# Patient Record
Sex: MALE | Race: WHITE | NOT HISPANIC OR LATINO | Employment: FULL TIME | URBAN - METROPOLITAN AREA
[De-identification: names, ages, dates, MRNs, and addresses within clinical notes are randomized per-mention and may not be internally consistent; named-entity substitution may affect disease eponyms.]

---

## 2019-07-17 ENCOUNTER — TRANSFERRED RECORDS (OUTPATIENT)
Dept: HEALTH INFORMATION MANAGEMENT | Facility: CLINIC | Age: 59
End: 2019-07-17

## 2021-05-21 ENCOUNTER — OFFICE VISIT (OUTPATIENT)
Dept: FAMILY MEDICINE | Facility: CLINIC | Age: 61
End: 2021-05-21
Payer: COMMERCIAL

## 2021-05-21 VITALS
TEMPERATURE: 97.5 F | WEIGHT: 233 LBS | RESPIRATION RATE: 16 BRPM | DIASTOLIC BLOOD PRESSURE: 70 MMHG | OXYGEN SATURATION: 97 % | BODY MASS INDEX: 34.51 KG/M2 | SYSTOLIC BLOOD PRESSURE: 100 MMHG | HEIGHT: 69 IN | HEART RATE: 61 BPM

## 2021-05-21 DIAGNOSIS — I78.1 NEVUS, NON-NEOPLASTIC: ICD-10-CM

## 2021-05-21 DIAGNOSIS — Z13.1 SCREENING FOR DIABETES MELLITUS: ICD-10-CM

## 2021-05-21 DIAGNOSIS — E66.811 CLASS 1 OBESITY DUE TO EXCESS CALORIES WITHOUT SERIOUS COMORBIDITY WITH BODY MASS INDEX (BMI) OF 34.0 TO 34.9 IN ADULT: ICD-10-CM

## 2021-05-21 DIAGNOSIS — Z84.81 FAMILY HISTORY OF BRCA GENE MUTATION: ICD-10-CM

## 2021-05-21 DIAGNOSIS — Z13.220 SCREENING CHOLESTEROL LEVEL: ICD-10-CM

## 2021-05-21 DIAGNOSIS — N50.89 MASS OF RIGHT TESTICLE: ICD-10-CM

## 2021-05-21 DIAGNOSIS — Z00.00 ROUTINE GENERAL MEDICAL EXAMINATION AT A HEALTH CARE FACILITY: Primary | ICD-10-CM

## 2021-05-21 DIAGNOSIS — E66.09 CLASS 1 OBESITY DUE TO EXCESS CALORIES WITHOUT SERIOUS COMORBIDITY WITH BODY MASS INDEX (BMI) OF 34.0 TO 34.9 IN ADULT: ICD-10-CM

## 2021-05-21 LAB
CHOLEST SERPL-MCNC: 184 MG/DL
GLUCOSE SERPL-MCNC: 90 MG/DL (ref 70–99)
HDLC SERPL-MCNC: 50 MG/DL
LDLC SERPL CALC-MCNC: 102 MG/DL
NONHDLC SERPL-MCNC: 134 MG/DL
TRIGL SERPL-MCNC: 162 MG/DL

## 2021-05-21 PROCEDURE — 99213 OFFICE O/P EST LOW 20 MIN: CPT | Mod: 25 | Performed by: FAMILY MEDICINE

## 2021-05-21 PROCEDURE — 99386 PREV VISIT NEW AGE 40-64: CPT | Performed by: FAMILY MEDICINE

## 2021-05-21 PROCEDURE — 80061 LIPID PANEL: CPT | Performed by: FAMILY MEDICINE

## 2021-05-21 PROCEDURE — 36415 COLL VENOUS BLD VENIPUNCTURE: CPT | Performed by: FAMILY MEDICINE

## 2021-05-21 PROCEDURE — 82947 ASSAY GLUCOSE BLOOD QUANT: CPT | Performed by: FAMILY MEDICINE

## 2021-05-21 ASSESSMENT — MIFFLIN-ST. JEOR: SCORE: 1858.29

## 2021-05-21 NOTE — LETTER
"May 24, 2021      Ned Wilcox  [address]  [address]   [place] [address]        Dear ,    We are writing to inform you of your test results.    \"Most recent diabetic screening has returned satisfactory.   Cholesterol screening also looks pretty good. No indication for any medications at this time. Continue to work on healthy diet and exercise.     Dr. Ryland Rivera\"    Resulted Orders   GLUCOSE   Result Value Ref Range    Glucose 90 70 - 99 mg/dL   Lipid panel reflex to direct LDL Fasting   Result Value Ref Range    Cholesterol 184 <200 mg/dL    Triglycerides 162 (H) <150 mg/dL      Comment:      Borderline high:  150-199 mg/dl  High:             200-499 mg/dl  Very high:       >499 mg/dl      HDL Cholesterol 50 >39 mg/dL    LDL Cholesterol Calculated 102 (H) <100 mg/dL      Comment:      Above desirable:  100-129 mg/dl  Borderline High:  130-159 mg/dL  High:             160-189 mg/dL  Very high:       >189 mg/dl      Non HDL Cholesterol 134 (H) <130 mg/dL      Comment:      Above Desirable:  130-159 mg/dl  Borderline high:  160-189 mg/dl  High:             190-219 mg/dl  Very high:       >219 mg/dl         If you have any questions or concerns, please call the clinic at the number listed above.       Sincerely,      Ryland Rivera, DO          "

## 2021-05-24 NOTE — RESULT ENCOUNTER NOTE
Most recent diabetic screening has returned satisfactory.     Cholesterol screening also looks pretty good. No indication for any medications at this time. Continue to work on healthy diet and exercise.     Dr. Ryland Rivera

## 2021-06-29 PROBLEM — J30.2 SEASONAL ALLERGIC RHINITIS: Status: ACTIVE | Noted: 2021-06-29

## 2021-06-30 ENCOUNTER — VIRTUAL VISIT (OUTPATIENT)
Dept: ONCOLOGY | Facility: CLINIC | Age: 61
End: 2021-06-30
Attending: FAMILY MEDICINE
Payer: COMMERCIAL

## 2021-06-30 DIAGNOSIS — Z80.3 FAMILY HISTORY OF MALIGNANT NEOPLASM OF BREAST: ICD-10-CM

## 2021-06-30 DIAGNOSIS — Z84.81 FAMILY HISTORY OF BRCA GENE MUTATION: Primary | ICD-10-CM

## 2021-06-30 DIAGNOSIS — Z80.49 FAMILY HISTORY OF UTERINE CANCER: ICD-10-CM

## 2021-06-30 PROCEDURE — 96040 HC GENETIC COUNSELING, EACH 30 MINUTES: CPT | Mod: TEL | Performed by: GENETIC COUNSELOR, MS

## 2021-06-30 NOTE — PROGRESS NOTES
6/30/2021    Referring Provider: Ryland Rivera MD    Presenting Information:   I met with Ned Wilcox today for genetic counseling at the Cancer Risk Management Program (virtual/telephone visit) to discuss his family history of breast and uterine cancer, including a previously identified BRCA2 mutation in the family.  He is here today to review this history and available genetic testing options.    Personal History:  Ned is a 61 year old male. He does not have any personal history of cancer.  He had a normal colonoscopy in 2013, and sees his primary care provider regularly.  He has been referred to dermatology.        Family History: (Please see scanned pedigree for detailed family history information)    One sister was diagnosed with breast cancer at age 48 and passed away at age 56.  Her daughter, Christina, completed genetic testing through [facility] which detected a pathogenic BRCA2 mutation called [id number] (also known as p.*).  One variant of unknown significance was detected in MSH6 (p.M492V).  Testing was otherwise negative for the 34 genes analyzed. A copy of her test report was available for review ([facility] Accession #[id number])      One sister was diagnosed with uterine cancer at age 60, and also reports that she carries the familial BRCA2 gene mutation.      His maternal uncle passed away due to brain cancer in his 30's    His father had a history of lymphoma diagnosed in his 70's    His maternal ethnicity is English/Y[place]i. His paternal ethnicity is Niuean.  There is no known Ashkenazi Pentecostal ancestry on either side of his family.     Discussion:    Ned has a family history of breast and uterine cancer with an identified BRCA2 gene mutation.  Specifically, the mutation identified in his niece, and reportedly one of his sisters, is called [id number] (also known as p.*).  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 A detailed handout regarding this cancer syndrome and the information we discussed was provided to Ned at the end of our appointment today and can be found in the after visit summary.  Topics included: inheritance pattern, cancer risks, cancer screening recommendations, and also risks, benefits and limitations of testing.    We reviewed that mutations in the BRCA2 gene are inherited in an autosomal dominant pattern.  This means that Ned has a 50% chance of inheriting the same mutation which was identified in his niece and reportedly his sister (however, a copy of his sister's positive test report was not available for review).  Should Ned be found to carry this BRCA2 mutation, his daughter would have a 50% chance of inheriting the mutation as well.      We discussed that there are additional genes that could cause increased risk for breast, uterine and other cancer. As many of these genes present with overlapping features in a family and accurate cancer risk cannot always be established based upon the pedigree analysis alone, it is sometimes reasonable to consider panel genetic testing to analyze multiple genes at once. Ned elected to first proceed with targeted testing today for the known familial variant.    Verbal consent was obtained and specific site BRCA2 analysis for the mutation previously identified in Ned's family was sent to [facility].  A copy of his niece's positive test report (performed through [facility]) was also sent to the testing laboratory.  A saliva kit will be mailed to Ned from [facility].     The information from genetic testing may determine additional cancer screening recommendations as well as options for risk reducing surgeries for Ned and  relatives, per current NCCN guidelines for BRCA2.   These recommendations will be discussed in detail once  genetic testing is completed.    Plan:  1) Today Ned elected to proceed with single site testing for the known BRCA2 mutation in the family.   2) This information should be available in 3-4 weeks.  3) Ned will schedule a return visit to discuss the results.    Face to face time: 40 minutes    Sherin Davies MS, [facility]  Licensed, Certified Genetic Counselor  [facility]  Phone: [phone]

## 2021-06-30 NOTE — LETTER
"    6/30/2021         RE: Ned Wilcox  [address]  [address]  [place] [address]        Dear Colleague,    Thank you for referring your patient, Ned Wilcox, to the [facility]. Please see a copy of my visit note below.    Ned is a 61 year old who is being evaluated via a billable telephone visit.      What phone number would you like to be contacted at? [phone]  How would you like to obtain your AVS? Cadynoekevin     Vitals - Patient Reported  Weight (Patient Reported): 102.1 kg (225 lb)  Height (Patient Reported): 174.6 cm (5' 8.75\")  BMI (Based on Pt Reported Ht/Wt): 33.47  Pain Score: No Pain (0)    Jazz FARR      6/30/2021    Referring Provider: Ryland Rivera MD    Presenting Information:   I met with Ned Wilcox today for genetic counseling at the Cancer Risk Management Program (virtual/telephone visit) to discuss his family history of breast and uterine cancer, including a previously identified BRCA2 mutation in the family.  He is here today to review this history and available genetic testing options.    Personal History:  Ned is a 61 year old male. He does not have any personal history of cancer.  He had a normal colonoscopy in 2013, and sees his primary care provider regularly.  He has been referred to dermatology.        Family History: (Please see scanned pedigree for detailed family history information)    One sister was diagnosed with breast cancer at age 48 and passed away at age 56.  Her daughter, Christina, completed genetic testing through [facility] which detected a pathogenic BRCA2 mutation called [id number] (also known as p.*).  One variant of unknown significance was detected in MSH6 (p.M492V).  Testing was otherwise negative for the 34 genes analyzed. A copy of her test report was available for review ([facility] Accession #[id number])      One sister was diagnosed with uterine cancer at age 60, and also reports that she carries " the familial BRCA2 gene mutation.      His maternal uncle passed away due to brain cancer in his 30's    His father had a history of lymphoma diagnosed in his 70's    His maternal ethnicity is English/Turkish. His paternal ethnicity is Kenyan.  There is no known Ashkenazi Yarsanism ancestry on either side of his family.     Discussion:    Ned has a family history of breast and uterine cancer with an identified BRCA2 gene mutation.  Specifically, the mutation identified in his niece, and reportedly one of his sisters, is called [id number] (also known as p.*).  We discussed that this gene is consistent with a diagnosis of Hereditary Breast and Ovarian Cancer (HBOC) syndrome     We discussed the natural history and genetics of HBOC caused by mutations in the BRCA2 gene. A detailed handout regarding this cancer syndrome and the information we discussed was provided to Ned at the end of our appointment today and can be found in the after visit summary.  Topics included: inheritance pattern, cancer risks, cancer screening recommendations, and also risks, benefits and limitations of testing.    We reviewed that mutations in the BRCA2 gene are inherited in an autosomal dominant pattern.  This means that Ned has a 50% chance of inheriting the same mutation which was identified in his niece and reportedly his sister (however, a copy of his sister's positive test report was not available for review).  Should Ned be found to carry this BRCA2 mutation, his daughter would have a 50% chance of inheriting the mutation as well.      We discussed that there are additional genes that could cause increased risk for breast, uterine and other cancer. As many of these genes present with overlapping features in a family and accurate cancer risk cannot always be established based upon the pedigree analysis alone, it is sometimes reasonable to consider panel genetic testing to analyze multiple genes at once. Ned elected to  first proceed with targeted testing today for the known familial variant.    Verbal consent was obtained and specific site BRCA2 analysis for the mutation previously identified in Ned's family was sent to [facility].  A copy of his niece's positive test report (performed through [facility]) was also sent to the testing laboratory.  A saliva kit will be mailed to Ned from [facility].     The information from genetic testing may determine additional cancer screening recommendations as well as options for risk reducing surgeries for Ned and  relatives, per current NCCN guidelines for BRCA2.   These recommendations will be discussed in detail once genetic testing is completed.    Plan:  1) Today Ned elected to proceed with single site testing for the known BRCA2 mutation in the family.   2) This information should be available in 3-4 weeks.  3) Ned will schedule a return visit to discuss the results.    Face to face time: 40 minutes    Sherin Davies MS, [facility]  Licensed, Certified Genetic Counselor  [facility]  Phone: [phone]                Again, thank you for allowing me to participate in the care of your patient.        Sincerely,        Sherin Davies GC

## 2021-07-02 NOTE — PATIENT INSTRUCTIONS
Assessing Cancer Risk  Only about 5-10% of cancers are thought to be due to an inherited cancer susceptibility gene.    These families often have:    Several people with the same or related types of cancer    Cancers diagnosed at a young age (before age 50)    Individuals with more than one primary cancer    Multiple generations of the family affected with cancer    BRCA1 and BRCA2 Genes  We each inherit two copies of every gene in our bodies: one from our mother, and one from our father.  Each gene has a specific job to do.  When a gene has a mistake or  mutation  in it, it does not work like it should.  Everyone has two copies of BRCA1 and two copies of BRCA2.  A single mutation in one of the copies of BRCA1 or BRCA2 increases the risk for breast and ovarian cancer, among others.  The risk for pancreatic cancer and melanoma may also be slightly increased in some families.  The tables below list the chance that someone with a BRCA mutation would develop cancer in his or her lifetime1,2,3,4.      Women   Men    General Population BRCA +   General Population BRCA +   Breast 12% 40-85%  Breast <1% ~7%   Ovarian 1-2% 10-40%  Prostate 16% 20%     Inheriting a mutation does not mean a person will develop cancer, but it does significantly increase his or her risk above the general population risk.    A person s ethnic background is also important to consider, as individuals of Ashkenazi Adventist ancestry have a higher chance of having a BRCA gene mutation.  There are three BRCA mutations that occur more frequently in this population.     Genetic Testing  Genetic testing involves a simple blood test and will look at the genetic information in the BRCA1 and BRCA2 genes for any harmful mutations that are associated with increased cancer risk.  If possible, it is recommended that the person(s) who has had cancer be tested before other family members.  That person will give us the most useful information about whether or not  a specific gene is associated with the cancer in the family.     Results  There are three possible results of BRCA1 and BRCA2 genetic testing:    Positive--a harmful mutation was identified    Negative--no mutation was identified    Variant of unknown significance--a variation in one of the genes was identified, but it is unclear how this impacts cancer risk in the family    Advantages and Disadvantages  There are advantages and disadvantages to genetic testing of these genes.    Advantages    May clarify your cancer risk    Can help you make medical decisions    May explain the cancers in your family    May give useful information to your family members (if you share your results)    Disadvantages    Possible negative emotional impact of learning about inherited cancer risk    Uncertainty in interpreting a negative test result in some situations    Possible genetic discrimination concerns (see below)    Inheritance   BRCA1 and BRCA2 mutations are inherited in an autosomal dominant pattern.  This means that if a parent has a mutation, each of his or her children will have a 50% chance of inheriting that same mutation.  Therefore, each child--male or female--would have a 50% chance of being at increased risk for developing cancer.                                              Image obtained from Genetics Home Reference, 2013     Genetic Information Nondiscrimination Act (EVELIA)  EVELIA is a federal law that protects individuals from health insurance or employment discrimination based on a genetic test result alone.  Although rare, there are currently no legal protections in terms of life insurance, long term care, or disability insurances.  Visit the National Human Genome Research [place] at Genome.gov/33204622 to learn more.    Reducing Cancer Risk  Current screening recommendations for women with a BRCA mutation include1:    Breast:  o Breast awareness starting at age 18  o Clinical breast exams starting at age 25;  repeated every 6-12 months  o Annual breast MRI starting at age 25 (or possibly younger)  o Annual mammogram (consider tomosynthesis) and breast MRI at age 30 (for women with and without a breast cancer history)  o Consideration of preventative mastectomy    Ovarian:   o Consideration of transvaginal ultrasound and CA-125 blood test starting at age 30 (or possibly younger); repeated every 6 months  o Recommendation for surgery to remove the ovaries and fallopian tubes after child bearing or by age 35-40. Women with BRCA2 mutations may delay this surgery until ages 40-45, unless it is warranted to consider sooner based on family history.    Some data suggests that women with BRCA1 mutations may be at slightly higher risk for serous uterine cancer. Information is limited at this time, and further research is needed to better understand this risk. Women with BRCA1 mutations should discuss the risks and benefits of hysterectomy at the time of ovary removal.     Current screening recommendations for men with a BRCA mutation include1:    Breast:  o Self-breast exams starting at age 35  o Annual clinical breast exams starting at age 35    Prostate:   o Recommend prostate cancer screening starting at age 45 for BRCA2 carriers  o Consider prostate cancer screening starting at age 45 for BRCA1 carriers    Both men and women with BRCA mutations should talk to their doctor or genetic counselor about screening for pancreatic cancer and melanoma.  In addition, the age at which to start screening may be modified based on family history of young cancer.    Questions to Think About Regarding Genetic Testing    What effect will the test result have on me and my relationship with my family members if I have an inherited gene mutation?  If I don t have a gene mutation?    Should I share my test results, and how will my family react to this news, which may also affect them?    Are my children ready to learn new information that may one  day affect their own health?    Resources  FORCE: Facing Our Risk of Cancer Empowered facingourrisk.org   Bright Pink bebrightpink.org   American Cancer Society (ACS) cancer.org   National Cancer [place] (NCI) cancer.gov     Please call us if you have any questions or concerns.   Cancer Risk Management Program [phone] ([phone])  ? Juan José Frias, MS [facility]  [phone]  ? Paige Fay, MS, [facility]  [phone]  ? Sherin Davies, MS, [facility]  [phone]  ? Radha Cook, MS, [facility] [phone]  ? Tawny Duran, MS, [facility] [phone]  ? Clotilde Munroe, MS, [facility]  [phone]    References:  1. National Comprehensive Cancer Network. Clinical practice guidelines in oncology, colorectal cancer screening. Available online (registration required). 2019.

## 2021-07-08 ENCOUNTER — OFFICE VISIT (OUTPATIENT)
Dept: DERMATOLOGY | Facility: CLINIC | Age: 61
End: 2021-07-08
Attending: FAMILY MEDICINE
Payer: COMMERCIAL

## 2021-07-08 VITALS — OXYGEN SATURATION: 99 % | HEART RATE: 73 BPM | SYSTOLIC BLOOD PRESSURE: 123 MMHG | DIASTOLIC BLOOD PRESSURE: 81 MMHG

## 2021-07-08 DIAGNOSIS — Q82.5 CONGENITAL NEVUS: ICD-10-CM

## 2021-07-08 DIAGNOSIS — D18.01 CHERRY ANGIOMA: ICD-10-CM

## 2021-07-08 DIAGNOSIS — L82.1 SEBORRHEIC KERATOSIS: ICD-10-CM

## 2021-07-08 DIAGNOSIS — D22.9 MULTIPLE BENIGN NEVI: Primary | ICD-10-CM

## 2021-07-08 DIAGNOSIS — L81.4 LENTIGO: ICD-10-CM

## 2021-07-08 DIAGNOSIS — L91.8 INFLAMED SKIN TAG: ICD-10-CM

## 2021-07-08 PROCEDURE — 11200 RMVL SKIN TAGS UP TO&INC 15: CPT | Performed by: PHYSICIAN ASSISTANT

## 2021-07-08 PROCEDURE — 99203 OFFICE O/P NEW LOW 30 MIN: CPT | Mod: 25 | Performed by: PHYSICIAN ASSISTANT

## 2021-07-08 NOTE — PROGRESS NOTES
Ned Wilcox is an extremely pleasant 61 year old year old male patient here today for skin check and skin tags on axilla. He notes skin tags will get irritate with clothing. He wear sunscreen but has had a history of blistering sunburns.  Patient has no other skin complaints today.  Remainder of the HPI, Meds, PMH, Allergies, FH, and SH was reviewed in chart.    Pertinent Hx:   No personal history of skin cancer.   History reviewed. No pertinent past medical history.    Past Surgical History:   Procedure Laterality Date     ANKLE FRACTURE SURGERY Right      APPENDECTOMY      age 14        Family History   Problem Relation Age of Onset     Dementia Mother      Hyperlipidemia Mother      Lymphoma Father      Diabetes Father      Breast Cancer Sister      Ovarian Cancer Sister      Breast Cancer Sister        Social History     Socioeconomic History     Marital status:      Spouse name: Not on file     Number of children: Not on file     Years of education: Not on file     Highest education level: Not on file   Occupational History     Not on file   Social Needs     Financial resource strain: Not on file     Food insecurity     Worry: Not on file     Inability: Not on file     Transportation needs     Medical: Not on file     Non-medical: Not on file   Tobacco Use     Smoking status: Never Smoker     Smokeless tobacco: Never Used   Substance and Sexual Activity     Alcohol use: Yes     Comment: couple beers a week     Drug use: Never     Sexual activity: Yes     Partners: Female   Lifestyle     Physical activity     Days per week: Not on file     Minutes per session: Not on file     Stress: Not on file   Relationships     Social connections     Talks on phone: Not on file     Gets together: Not on file     Attends Restoration service: Not on file     Active member of club or organization: Not on file     Attends meetings of clubs or organizations: Not on file     Relationship status: Not on file     Intimate  partner violence     Fear of current or ex partner: Not on file     Emotionally abused: Not on file     Physically abused: Not on file     Forced sexual activity: Not on file   Other Topics Concern     Not on file   Social History Narrative     Not on file       No outpatient encounter medications on file as of 7/8/2021.     No facility-administered encounter medications on file as of 7/8/2021.              O:   NAD, WDWN, Alert & Oriented, Mood & Affect wnl, Vitals stable   Here today alone   /81   Pulse 73   SpO2 99%    General appearance normal   Vitals stable   Alert, oriented and in no acute distress   Skin colored pedunculated papules on axilla   Stuck on papules and brown macules on trunk and ext   Red papules on trunk  Brown papules and macules with regular pigment network and borders on torso and extremities  White papule on on right temple  Brown plaque on left leg    The remainder of skin exam is normal     Eyes: Conjunctivae/lids:Normal     ENT: Lips: normal    MSK:Normal    Pulm: Breathing Normal    Neuro/Psych: Orientation:Alert and Orientedx3 ; Mood/Affect:normal   A/P:  1. Milia on right temple   With patient consent, unroofed and expressed.  2. Inflamed skin tags on axilla  With patient consent, tangential excision was preformed. Routine wound care.   3.  Seborrheic keratosis, lentigo, angioma, benign nevi, benign nevi, congenital nevus   BENIGN LESIONS DISCUSSED WITH PATIENT:  I discussed the specifics of tumor, prognosis, and genetics of benign lesions.  I explained that treatment of these lesions would be purely cosmetic and not medically neccessary.  I discussed with patient different removal options including excision, cautery and /or laser.      Nature and genetics of benign skin lesions dicussed with patient.  Signs and Symptoms of skin cancer discussed with patient.  ABCDEs of melanoma reviewed with patient.  Patient encouraged to perform monthly skin exams.  UV precautions reviewed  with patient.  Risks of non-melanoma skin cancer discussed with patient   Return to clinic in one year or sooner if needed.

## 2021-07-08 NOTE — LETTER
7/8/2021         RE: Ned Wilcox  [address]  [address]  [facility] [address]        Dear Colleague,    Thank you for referring your patient, Ned Wilcox, to the [facility]. Please see a copy of my visit note below.    Ned Wilcox is an extremely pleasant 61 year old year old male patient here today for skin check and skin tags on axilla. He notes skin tags will get irritate with clothing. He wear sunscreen but has had a history of blistering sunburns.  Patient has no other skin complaints today.  Remainder of the HPI, Meds, PMH, Allergies, FH, and SH was reviewed in chart.    Pertinent Hx:   No personal history of skin cancer.   History reviewed. No pertinent past medical history.    Past Surgical History:   Procedure Laterality Date     ANKLE FRACTURE SURGERY Right      APPENDECTOMY      age 14        Family History   Problem Relation Age of Onset     Dementia Mother      Hyperlipidemia Mother      Lymphoma Father      Diabetes Father      Breast Cancer Sister      Ovarian Cancer Sister      Breast Cancer Sister        Social History     Socioeconomic History     Marital status:      Spouse name: Not on file     Number of children: Not on file     Years of education: Not on file     Highest education level: Not on file   Occupational History     Not on file   Social Needs     Financial resource strain: Not on file     Food insecurity     Worry: Not on file     Inability: Not on file     Transportation needs     Medical: Not on file     Non-medical: Not on file   Tobacco Use     Smoking status: Never Smoker     Smokeless tobacco: Never Used   Substance and Sexual Activity     Alcohol use: Yes     Comment: couple beers a week     Drug use: Never     Sexual activity: Yes     Partners: Female   Lifestyle     Physical activity     Days per week: Not on file     Minutes per session: Not on file     Stress: Not on file   Relationships     Social connections     Talks on  phone: Not on file     Gets together: Not on file     Attends [employer] service: Not on file     Active member of club or organization: Not on file     Attends meetings of clubs or organizations: Not on file     Relationship status: Not on file     Intimate partner violence     Fear of current or ex partner: Not on file     Emotionally abused: Not on file     Physically abused: Not on file     Forced sexual activity: Not on file   Other Topics Concern     Not on file   Social History Narrative     Not on file       No outpatient encounter medications on file as of 7/8/2021.     No facility-administered encounter medications on file as of 7/8/2021.              O:   NAD, WDWN, Alert & Oriented, Mood & Affect wnl, Vitals stable   Here today alone   /81   Pulse 73   SpO2 99%    General appearance normal   Vitals stable   Alert, oriented and in no acute distress   Skin colored pedunculated papules on axilla   Stuck on papules and brown macules on trunk and ext   Red papules on trunk  Brown papules and macules with regular pigment network and borders on torso and extremities  White papule on on right temple  Brown plaque on left leg    The remainder of skin exam is normal     Eyes: Conjunctivae/lids:Normal     ENT: Lips: normal    MSK:Normal    Pulm: Breathing Normal    Neuro/Psych: Orientation:Alert and Orientedx3 ; Mood/Affect:normal   A/P:  1. Milia on right temple   With patient consent, unroofed and expressed.  2. Inflamed skin tags on axilla  With patient consent, tangential excision was preformed. Routine wound care.   3.  Seborrheic keratosis, lentigo, angioma, benign nevi, benign nevi, congenital nevus   BENIGN LESIONS DISCUSSED WITH PATIENT:  I discussed the specifics of tumor, prognosis, and genetics of benign lesions.  I explained that treatment of these lesions would be purely cosmetic and not medically neccessary.  I discussed with patient different removal options including excision, cautery and  /or laser.      Nature and genetics of benign skin lesions dicussed with patient.  Signs and Symptoms of skin cancer discussed with patient.  ABCDEs of melanoma reviewed with patient.  Patient encouraged to perform monthly skin exams.  UV precautions reviewed with patient.  Risks of non-melanoma skin cancer discussed with patient   Return to clinic in one year or sooner if needed.         Again, thank you for allowing me to participate in the care of your patient.        Sincerely,        Elicia Arellano PA-C

## 2021-08-09 ENCOUNTER — VIRTUAL VISIT (OUTPATIENT)
Dept: ONCOLOGY | Facility: CLINIC | Age: 61
End: 2021-08-09
Attending: GENETIC COUNSELOR, MS
Payer: COMMERCIAL

## 2021-08-09 DIAGNOSIS — Z84.81 FAMILY HISTORY OF BRCA GENE MUTATION: Primary | ICD-10-CM

## 2021-08-09 DIAGNOSIS — Z80.3 FAMILY HISTORY OF MALIGNANT NEOPLASM OF BREAST: ICD-10-CM

## 2021-08-09 DIAGNOSIS — Z80.49 FAMILY HISTORY OF UTERINE CANCER: ICD-10-CM

## 2021-08-09 PROCEDURE — 999N000069 HC STATISTIC GENETIC COUNSELING, < 16 MIN: Mod: TEL | Performed by: GENETIC COUNSELOR, MS

## 2021-08-09 NOTE — PROGRESS NOTES
"8/9/2021    Referring Provider: Ryland Rivera MD    Presenting Information:  Ned Wilcox returned to the Cancer Risk Management Program (virtual visit) to discuss his genetic testing results. Specific site analysis of BRCA2 was ordered through [employer] due to his family history of a known mutation.       Genetic Testing Results: NEGATIVE   Ned's test results were negative.  He does not carry the [id number] pathogenic mutation in the BRCA2 gene, which was previously identified in his niece. This test only looked for this one mutation.    A copy of the test report can be found in the Laboratory tab, dated 7/20/2021, and named \"SEND OUTS MISC TEST\". The report is scanned in as a linked document.    Interpretation:  Based on this test result, Ned does not have HBOC syndrome and is not at an increased risk for the associated cancers. Even though he does not have the familial BRCA2 mutation, he is still at general population lifetime risk for cancer and should be followed for routine screening.       Screening:  We discussed the importance of cancer screening based on Ned's personal and family history.  Population cancer screening options, such as those recommended by the American Cancer Society and the National Comprehensive Cancer Network (NCCN), are appropriate for Ned at this time. These screening recommendations may change if there are changes to Ned's personal and/or family history of cancer. Final screening recommendations should be made by each individual's managing physician.    Inheritance:  We reviewed the autosomal dominant inheritance of this BRCA2 mutation. We discussed that Ned cannot pass on this mutation to his children based on this test result.  Mutations in this gene do not skip generations.      Plan:  1. I provided Ned with the results of his specific site BRCA2 analysis.   2. Ned is encouraged to contact me annually and/or with any changes to his personal/family history, as this " information may inform future cancer screening or genetic testing recommendations.  3. If there are any further questions or concerns, he should not hesitate to contact me at [phone].    Time spent on phone: 10 minutes    Sherin Davies MS, [facility] [place]  Licensed, Certified Genetic Counselor  [facility]  Phone: [phone]

## 2021-08-09 NOTE — LETTER
"    8/9/2021         RE: Ned Wilcox  [address]  [address]  [facility] [address]        Dear Colleague,    Thank you for referring your patient, Ned Wilcox, to the [facility]. Please see a copy of my visit note below.    8/9/2021    Referring Provider: Ryland Rivera MD    Presenting Information:  Ned Wilcox returned to the Cancer Risk Management Program (virtual visit) to discuss his genetic testing results. Specific site analysis of BRCA2 was ordered through [employer] due to his family history of a known mutation.       Genetic Testing Results: NEGATIVE   Ned's test results were negative.  He does not carry the [id number] pathogenic mutation in the BRCA2 gene, which was previously identified in his niece. This test only looked for this one mutation.    A copy of the test report can be found in the Laboratory tab, dated 7/20/2021, and named \"SEND OUTS MISC TEST\". The report is scanned in as a linked document.    Interpretation:  Based on this test result, Ned does not have HBOC syndrome and is not at an increased risk for the associated cancers. Even though he does not have the familial BRCA2 mutation, he is still at general population lifetime risk for cancer and should be followed for routine screening.       Screening:  We discussed the importance of cancer screening based on Ned's personal and family history.  Population cancer screening options, such as those recommended by the American Cancer Society and the National Comprehensive Cancer Network (NCCN), are appropriate for Ned at this time. These screening recommendations may change if there are changes to Ned's personal and/or family history of cancer. Final screening recommendations should be made by each individual's managing physician.    Inheritance:  We reviewed the autosomal dominant inheritance of this BRCA2 mutation. We discussed that Ned cannot pass on this mutation to his children based " on this test result.  Mutations in this gene do not skip generations.      Plan:  1. I provided Ned with the results of his specific site BRCA2 analysis.   2. Ned is encouraged to contact me annually and/or with any changes to his personal/family history, as this information may inform future cancer screening or genetic testing recommendations.  3. If there are any further questions or concerns, he should not hesitate to contact me at [phone].    Time spent on phone: 10 minutes    Sherin Davies MS, [facility] [place]  Licensed, Certified Genetic Counselor  [facility]  Phone: [phone]                Again, thank you for allowing me to participate in the care of your patient.        Sincerely,        Sherin Davies GC

## 2021-08-09 NOTE — LETTER
Cancer Risk Management  Program Locations    [facility]  [facility]  [facility]  [facility]  Mailing Address  Cancer Risk Management Program  [address]  [place] [address]    New patient appointments  [phone]  August 18, 2021    Ned Wilcox  [address]  [address]   [facility] [address]      Dear Ned,    It was a pleasure speaking with you on the phone on 8/9/2021. Here is a copy of the progress note from our discussion. If you have any additional questions, please feel free to call.    Referring Provider: Ryland Rivera MD    Presenting Information:  Ned Wilcox returned to the Cancer Risk Management Program (virtual visit) to discuss his genetic testing results. Specific site analysis of BRCA2 was ordered through [employer] [facility] due to his family history of a known mutation.       Genetic Testing Results: NEGATIVE   Ned's test results were negative.  He does not carry the [id number] pathogenic mutation in the BRCA2 gene, which was previously identified in his niece. This test only looked for this one mutation.    Interpretation:  Based on this test result, Ned does not have HBOC syndrome and is not at an increased risk for the associated cancers. Even though he does not have the familial BRCA2 mutation, he is still at general population lifetime risk for cancer and should be followed for routine screening.       Screening:  We discussed the importance of cancer screening based on Ned's personal and family history.  Population cancer screening options, such as those recommended by the American Cancer Society and the National Comprehensive Cancer Network (NCCN), are appropriate for Ned at this time. These screening recommendations may change if there are changes to Ned's personal and/or family history of  cancer. Final screening recommendations should be made by each individual's managing physician.    Inheritance:  We reviewed the autosomal dominant inheritance of this BRCA2 mutation. We discussed that Ned cannot pass on this mutation to his children based on this test result.  Mutations in this gene do not skip generations.      Plan:  1. I provided Ned with the results of his specific site BRCA2 analysis.   2. Ned is encouraged to contact me annually and/or with any changes to his personal/family history, as this information may inform future cancer screening or genetic testing recommendations.  3. If there are any further questions or concerns, he should not hesitate to contact me at [phone].    Sherin Davies MS, [facility] [place]  Licensed, Certified Genetic Counselor  [facility]  Phone: [phone]

## 2021-09-01 ENCOUNTER — TELEPHONE (OUTPATIENT)
Dept: ONCOLOGY | Facility: CLINIC | Age: 61
End: 2021-09-01

## 2021-09-01 NOTE — TELEPHONE ENCOUNTER
Ned contacted me today to inquire about addition genetic testing related to cancer, including PALB2, as it was not included in his previous test.  We reviewed that his testing included single site analysis for the known BRCA2 gene mutation in the family, which contributed to his sister's breast cancer.  We discussed the low likelihood of finding a second mutation, however, there is still a possibility of families to carry two or more cancer susceptibility genes.  I will follow up with [employer] to determine logistics of this add-on testing (re billing and sample collection), and will follow up with Ned to discuss options.

## 2021-09-08 ENCOUNTER — TELEPHONE (OUTPATIENT)
Dept: ONCOLOGY | Facility: CLINIC | Age: 61
End: 2021-09-08

## 2021-09-08 DIAGNOSIS — Z80.3 FAMILY HISTORY OF MALIGNANT NEOPLASM OF BREAST: Primary | ICD-10-CM

## 2021-09-08 DIAGNOSIS — Z80.49 FAMILY HISTORY OF UTERINE CANCER: ICD-10-CM

## 2021-09-08 NOTE — TELEPHONE ENCOUNTER
I followed up with Ned today regarding additional genetic testing options for hereditary cancer, per his request.  He was previously seen as part of the Cancer Risk Management Program for specific site analysis of BRCA2.  Ned expressed interest in expanded panel testing, and today elected to proceed with the CancerNext panel through [facility]. Verbal consent was obtained for BRCA1/2 with CancerNext 36 gene panel offered through [facility]: APC, JEMMA, AXIN2, BARD1, BMPR1A, BRCA1, BRCA2, BRIP1, CDH1, CDK4, CDKN2A, CHEK2, DICER1, EPCAM, GREM1, HOXB13, MLH1, MSH2, MSH3, MSH6, MUTYH, NBN, NF1, NTHL1, PALB2, PMS2, POLD1, POLE, PTEN, RAD51C, RAD51D, RECQL, SMAD4, SMARCA4, STK11, and TP53.      No new sample is needed (saliva sample from previous test is available, per [facility]).  I informed Ned that, as this is a separate order from his previous Single Site BRCA2 Analysis, a new bill may be generated by the laboratory.  We discussed potential out of pocket cost and billing options, and Ned verbalized understanding.  Test turn around time: approximately 3 week.

## 2021-09-19 ENCOUNTER — HEALTH MAINTENANCE LETTER (OUTPATIENT)
Age: 61
End: 2021-09-19

## 2021-10-20 ENCOUNTER — VIRTUAL VISIT (OUTPATIENT)
Dept: ONCOLOGY | Facility: CLINIC | Age: 61
End: 2021-10-20
Attending: GENETIC COUNSELOR, MS
Payer: COMMERCIAL

## 2021-10-20 DIAGNOSIS — Z80.49 FAMILY HISTORY OF UTERINE CANCER: ICD-10-CM

## 2021-10-20 DIAGNOSIS — Z80.3 FAMILY HISTORY OF MALIGNANT NEOPLASM OF BREAST: Primary | ICD-10-CM

## 2021-10-20 PROCEDURE — 999N000069 HC STATISTIC GENETIC COUNSELING, < 16 MIN: Mod: TEL,95 | Performed by: GENETIC COUNSELOR, MS

## 2021-10-20 NOTE — LETTER
10/20/2021         RE: Ned Wilcox  [address]  [address]  [facility] [address]        Dear Colleague,    Thank you for referring your patient, Ned Wilcox, to the [facility]. Please see a copy of my visit note below.    Ned returned to the Cancer Risk Management Program to discuss his genetic test results for the CancerNext 36 gene panel.  Testing was previously completed for a known BRCA2 mutation in the family, and these results were discussed in detail 8/9/2021. He then elected to proceed with expanded hereditary cancer testing for the CancerNext 36 gene panel through [facility].       Genetic Testing Results: NEGATIVE  No pathogenic mutations, variants of unknown significance, or gross deletions or duplications were detected.     Genes Analyzed (36 total): APC, JEMMA, AXIN2, BARD1, BMPR1A, BRCA1, BRCA2, BRIP1, CDH1, CDK4, CDKN2A, CHEK2, DICER1, MLH1, MSH2, MSH3, MSH6, MUTYH, NBN, NF1, NTHL1, PALB2, PMS2, PTEN, RAD51C, RAD51D, RECQL, SMAD4, SMARCA4, STK11 and TP53 (sequencing and deletion/duplication); HOXB13, POLD1 and POLE (sequencing only); EPCAM and GREM1 (deletion/duplication  only).      The BRCA2 [id number] and MSH6 p.M492V alterations, which were previously identified in his family, were not detected.    Screening:  We discussed the importance of cancer screening based on Ned's personal and family history.  Population cancer screening options, such as those recommended by the American Cancer Society and the National Comprehensive Cancer Network (NCCN), are appropriate for Ned at this time. These screening recommendations may change if there are changes to Ned's personal and/or family history of cancer. Final screening recommendations should be made by each individual's managing physician.    Inheritance:  We reviewed the autosomal dominant inheritance of the 36 genes analyzed. We discussed that Ned cannot pass on a currently identifiable mutation  to his children based on this test result.  Mutations in this gene do not skip generations.    Plan:  1. I provided Ned with the results of his genetic testing.   2. Ned is encouraged to contact me annually and/or with any changes to his personal/family history, as this information may inform future cancer screening or genetic testing recommendations.  3. If there are any further questions or concerns, he should not hesitate to contact me at [phone].    Sherin Davies MS, [facility]  Licensed, Certified Genetic Counselor  [facility]  Phone: [phone]    Time spent over phone: 5 minutes      Again, thank you for allowing me to participate in the care of your patient.        Sincerely,        Sherin Davies GC

## 2021-10-21 NOTE — PROGRESS NOTES
Ned returned to the Cancer Risk Management Program to discuss his genetic test results for the CancerNext 36 gene panel.  Testing was previously completed for a known BRCA2 mutation in the family, and these results were discussed in detail 8/9/2021. He then elected to proceed with expanded hereditary cancer testing for the CancerNext 36 gene panel through [facility].       Genetic Testing Results: NEGATIVE  No pathogenic mutations, variants of unknown significance, or gross deletions or duplications were detected.     Genes Analyzed (36 total): APC, JEMMA, AXIN2, BARD1, BMPR1A, BRCA1, BRCA2, BRIP1, CDH1, CDK4, CDKN2A, CHEK2, DICER1, MLH1, MSH2, MSH3, MSH6, MUTYH, NBN, NF1, NTHL1, PALB2, PMS2, PTEN, RAD51C, RAD51D, RECQL, SMAD4, SMARCA4, STK11 and TP53 (sequencing and deletion/duplication); HOXB13, POLD1 and POLE (sequencing only); EPCAM and GREM1 (deletion/duplication  only).      The BRCA2 [id number] and MSH6 p.M492V alterations, which were previously identified in his family, were not detected.    Screening:  We discussed the importance of cancer screening based on Ned's personal and family history.  Population cancer screening options, such as those recommended by the American Cancer Society and the National Comprehensive Cancer Network (NCCN), are appropriate for Ned at this time. These screening recommendations may change if there are changes to Ned's personal and/or family history of cancer. Final screening recommendations should be made by each individual's managing physician.    Inheritance:  We reviewed the autosomal dominant inheritance of the 36 genes analyzed. We discussed that Ned cannot pass on a currently identifiable mutation to his children based on this test result.  Mutations in this gene do not skip generations.    Plan:  1. I provided Ned with the results of his genetic testing.   2. Ned is encouraged to contact me annually and/or with any changes to his personal/family history, as  this information may inform future cancer screening or genetic testing recommendations.  3. If there are any further questions or concerns, he should not hesitate to contact me at [phone].    Sherin Davies MS, [facility] [place]  Licensed, Certified Genetic Counselor  [facility]  Phone: [phone]    Time spent over phone: 5 minutes

## 2022-06-25 ENCOUNTER — HEALTH MAINTENANCE LETTER (OUTPATIENT)
Age: 62
End: 2022-06-25

## 2022-10-09 ENCOUNTER — HOSPITAL ENCOUNTER (EMERGENCY)
Facility: CLINIC | Age: 62
Discharge: HOME | End: 2022-10-09
Attending: NURSE PRACTITIONER | Admitting: NURSE PRACTITIONER
Payer: COMMERCIAL

## 2022-10-09 VITALS
WEIGHT: 220 LBS | RESPIRATION RATE: 16 BRPM | TEMPERATURE: 97.2 F | BODY MASS INDEX: 32.73 KG/M2 | HEART RATE: 67 BPM | SYSTOLIC BLOOD PRESSURE: 133 MMHG | DIASTOLIC BLOOD PRESSURE: 77 MMHG | OXYGEN SATURATION: 98 %
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
## 2022-10-09 PROCEDURE — 99213 OFFICE O/P EST LOW 20 MIN: CPT | Performed by: NURSE PRACTITIONER

## 2022-10-09 PROCEDURE — G0463 HOSPITAL OUTPT CLINIC VISIT: HCPCS | Performed by: NURSE PRACTITIONER

## 2022-10-09 RX ORDER — DOXYCYCLINE 100 MG/1
200 CAPSULE ORAL ONCE
Qty: 2 CAPSULE | Refills: 0 | Status: SHIPPED | OUTPATIENT
Start: 2022-10-09 | End: 2022-10-09

## 2022-10-09 ASSESSMENT — ACTIVITIES OF DAILY LIVING (ADL): ADLS_ACUITY_SCORE: 35

## 2022-10-09 NOTE — DISCHARGE INSTRUCTIONS
The deer tick was removed in entirety.  I recommend taking doxycycline 200 mg x 1 dose tomorrow morning when you  the prescription from [facility].  You should not take any calcium-containing products 1 hour before 1 after taking this medication.  This should help prevent Lyme's disease.  I recommend monitoring for fever, aches, joint pain over the next 2 weeks.  Return if you have the symptoms.  Thank you for coming in

## 2022-10-10 NOTE — ED PROVIDER NOTES
History     Chief Complaint   Patient presents with     Tick Bite     [place] a [place]     HPI  Ned Wilcox is a 62 year old male who presents to urgent care due to an embedded remnant of deer tick.  Patient [place] hunting [place].  Patient states he returned and noted a deer tick in his left genital pubic region patient states his wife attempted to remove the tick and part of it remains.  Patient states it was a deer tick.  Patient states that removal was completed approximately 2 hours ago and the tick was embedded for between 24 and 72 hours.  Patient denies any current fever, chills, joint aches, headaches.    Allergies:  No Known Allergies    Problem List:    Patient Active Problem List    Diagnosis Date Noted     Seasonal allergic rhinitis 06/29/2021     Priority: Medium     Family history of BRCA gene mutation 05/21/2021     Priority: Medium     Nevus, non-neoplastic 05/21/2021     Priority: Medium     Mass of right testicle 05/21/2021     Priority: Medium     Reports present for 30 years. 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 No pertinent past medical history.    Past Surgical History:    Past Surgical History:   Procedure Laterality Date     ANKLE FRACTURE SURGERY Right      APPENDECTOMY      age 14       Family History:    Family History   Problem Relation Age of Onset     Dementia Mother      Hyperlipidemia Mother      Lymphoma Father      Diabetes Father      Breast Cancer Sister      Ovarian Cancer Sister      Breast Cancer Sister        Social History:  Marital Status:   [2]  Social History     Tobacco Use     Smoking status: Never     Smokeless tobacco: Never   Substance Use Topics     Alcohol use: Yes     Comment: couple beers a week     Drug use: Never         Medications:    doxycycline hyclate (VIBRAMYCIN) 100 MG capsule      Review of Systems  As mentioned above in the history present illness. All other systems were reviewed and are negative.    Physical Exam   BP: 133/77  Pulse: 67  Temp: 97.2  F (36.2  C)  Resp: 16  Weight: 99.8 kg (220 lb)  SpO2: 98 %      Physical Exam  Vitals and nursing note reviewed.   Constitutional:       General: He is not in acute distress.     Appearance: He is well-developed and well-nourished. He is not diaphoretic.   HENT:      Head: Normocephalic and atraumatic.      Right Ear: Hearing and external ear normal.      Left Ear: Hearing and external ear normal.      Nose: Nose normal.      Mouth/Throat:      Mouth: Oropharynx is clear and moist.   Eyes:      General: No scleral icterus.        Right eye: No discharge.         Left eye: No discharge.      Conjunctiva/sclera: Conjunctivae normal.   Cardiovascular:      Rate and Rhythm: Normal rate and regular rhythm.      Pulses: Intact distal pulses.      Heart sounds: Normal heart sounds. No murmur heard.    No friction rub. No gallop.   Pulmonary:      Effort: Pulmonary effort is normal. No respiratory distress.      Breath sounds: Normal breath sounds. No stridor. No wheezing or rales.   Musculoskeletal:         General: No tenderness or edema.   Skin:     General: Skin is warm.      Capillary Refill: Capillary refill takes less than 2 seconds.      Findings: No rash.          Neurological:      Mental Status: He is alert and oriented to person, place, and time.   Psychiatric:         Mood and Affect: Mood and affect normal.         Behavior: Behavior is cooperative.         ED Course                 Procedures    No results found for this or any previous visit (from the past 24 hour(s)).    Medications - No data to display    Assessments & Plan (with Medical Decision Making)     I have reviewed the nursing notes.    I have reviewed the findings, diagnosis, plan and need for follow up  with the patient.  62-year-old male presents to urgent care with embedded deer tick in the pubic region.  Deer tick was partially removed by wife.  On exam remains of deer tick is noted to be embedded.  Deer tick was removed without difficulty following placement of Sensorcaine with epinephrine.  16-gauge needle and tweezers was utilized for removal.  Patient tolerated the procedure well   No remaining deer tick noted.  Due to deer tick embedded meant for unknown length of time we will treat with 200 mg of doxycycline.  Discharge Medication List as of 10/9/2022  7:00 PM      START taking these medications    Details   doxycycline hyclate (VIBRAMYCIN) 100 MG capsule Take 2 capsules (200 mg) by mouth once for 1 dose, Disp-2 capsule, R-0, E-Prescribe             Final diagnoses:   Tick bite with subsequent removal of tick       10/9/2022   [facility] EMERGENCY DEPT     Sandra Cifuentes, DELMY CNP  10/09/22 2040

## 2022-11-20 ENCOUNTER — HEALTH MAINTENANCE LETTER (OUTPATIENT)
Age: 62
End: 2022-11-20

## 2023-02-10 ENCOUNTER — OFFICE VISIT (OUTPATIENT)
Dept: FAMILY MEDICINE | Facility: CLINIC | Age: 63
End: 2023-02-10
Payer: COMMERCIAL

## 2023-02-10 VITALS
OXYGEN SATURATION: 98 % | RESPIRATION RATE: 16 BRPM | SYSTOLIC BLOOD PRESSURE: 138 MMHG | HEART RATE: 71 BPM | DIASTOLIC BLOOD PRESSURE: 80 MMHG | WEIGHT: 238.4 LBS | BODY MASS INDEX: 35.31 KG/M2 | HEIGHT: 69 IN | TEMPERATURE: 97.3 F

## 2023-02-10 DIAGNOSIS — M10.9 ACUTE GOUT OF LEFT FOOT, UNSPECIFIED CAUSE: Primary | ICD-10-CM

## 2023-02-10 LAB
BASOPHILS # BLD AUTO: 0 10E3/UL (ref 0–0.2)
BASOPHILS NFR BLD AUTO: 0 %
CRP SERPL-MCNC: 3.31 MG/L
EOSINOPHIL # BLD AUTO: 0.2 10E3/UL (ref 0–0.7)
EOSINOPHIL NFR BLD AUTO: 2 %
ERYTHROCYTE [DISTWIDTH] IN BLOOD BY AUTOMATED COUNT: 14 % (ref 10–15)
ERYTHROCYTE [SEDIMENTATION RATE] IN BLOOD BY WESTERGREN METHOD: 5 MM/HR (ref 0–20)
HCT VFR BLD AUTO: 52.7 % (ref 40–53)
HGB BLD-MCNC: 17.3 G/DL (ref 13.3–17.7)
LYMPHOCYTES # BLD AUTO: 3.1 10E3/UL (ref 0.8–5.3)
LYMPHOCYTES NFR BLD AUTO: 26 %
MCH RBC QN AUTO: 27.9 PG (ref 26.5–33)
MCHC RBC AUTO-ENTMCNC: 32.8 G/DL (ref 31.5–36.5)
MCV RBC AUTO: 85 FL (ref 78–100)
MONOCYTES # BLD AUTO: 0.7 10E3/UL (ref 0–1.3)
MONOCYTES NFR BLD AUTO: 6 %
NEUTROPHILS # BLD AUTO: 7.9 10E3/UL (ref 1.6–8.3)
NEUTROPHILS NFR BLD AUTO: 67 %
PLATELET # BLD AUTO: 158 10E3/UL (ref 150–450)
RBC # BLD AUTO: 6.2 10E6/UL (ref 4.4–5.9)
URATE SERPL-MCNC: 6.9 MG/DL (ref 3.4–7)
WBC # BLD AUTO: 11.9 10E3/UL (ref 4–11)

## 2023-02-10 PROCEDURE — 85025 COMPLETE CBC W/AUTO DIFF WBC: CPT | Performed by: NURSE PRACTITIONER

## 2023-02-10 PROCEDURE — 99213 OFFICE O/P EST LOW 20 MIN: CPT | Performed by: NURSE PRACTITIONER

## 2023-02-10 PROCEDURE — 36415 COLL VENOUS BLD VENIPUNCTURE: CPT | Performed by: NURSE PRACTITIONER

## 2023-02-10 PROCEDURE — 86140 C-REACTIVE PROTEIN: CPT | Performed by: NURSE PRACTITIONER

## 2023-02-10 PROCEDURE — 84550 ASSAY OF BLOOD/URIC ACID: CPT | Performed by: NURSE PRACTITIONER

## 2023-02-10 PROCEDURE — 85652 RBC SED RATE AUTOMATED: CPT | Performed by: NURSE PRACTITIONER

## 2023-02-10 RX ORDER — PREDNISONE 20 MG/1
TABLET ORAL
Qty: 15 TABLET | Refills: 0 | Status: SHIPPED | OUTPATIENT
Start: 2023-02-10 | End: 2023-02-20

## 2023-02-10 ASSESSMENT — PAIN SCALES - GENERAL: PAINLEVEL: MILD PAIN (2)

## 2023-02-10 NOTE — PROGRESS NOTES
"  Assessment & Plan     Acute gout of left foot, unspecified cause  -discussed gout diet  - Uric acid; Future  - CRP, inflammation; Future  - ESR: Erythrocyte sedimentation rate; Future  - CBC with platelets and differential; Future  - predniSONE (DELTASONE) 20 MG tablet; Take 1 tablet (20 mg) by mouth 2 times daily for 5 days, THEN 1 tablet (20 mg) daily for 5 days.  - CBC with platelets and differential  - ESR: Erythrocyte sedimentation rate  - CRP, inflammation  - Uric acid        Return in about 1 week (around 2/17/2023), or if symptoms worsen or fail to improve.    DELMY Washington CNP  [facility] BRAYAN Marino is a 62 year old patient, presenting for the following health issues:  Musculoskeletal Problem      Musculoskeletal Problem    History of Present Illness       Reason for visit:  Sore foot  Symptom onset:  1-3 days ago    He eats 0-1 servings of fruits and vegetables daily.He consumes 0 sweetened beverage(s) daily.He exercises with enough effort to increase his heart rate 10 to 19 minutes per day.  He exercises with enough effort to increase his heart rate 3 or less days per week.   He is taking medications regularly.             Review of Systems   Constitutional, HEENT, cardiovascular, pulmonary, gi and gu systems are negative, except as otherwise noted.      Objective    /80 (BP Location: Left arm, Patient Position: Sitting, Cuff Size: Adult Large)   Pulse 71   Temp 97.3  F (36.3  C) (Tympanic)   Resp 16   Ht 1.746 m (5' 8.75\")   Wt 108.1 kg (238 lb 6.4 oz)   SpO2 98%   BMI 35.46 kg/m    Body mass index is 35.46 kg/m .  Physical Exam   GENERAL: healthy, alert and no distress  EYES: Eyes grossly normal to inspection, PERRL and conjunctivae and sclerae normal  CV: regular rate and rhythm, normal S1 S2, no S3 or S4, no murmur, click or rub, no peripheral edema and peripheral pulses strong  MS: left foot dorsal surface small area of mild edema, erythema, hot " and tender to palpation   SKIN: no suspicious lesions or rashes  PSYCH: mentation appears normal, affect normal/bright    Results for orders placed or performed in visit on 02/10/23   ESR: Erythrocyte sedimentation rate     Status: Normal   Result Value Ref Range    Erythrocyte Sedimentation Rate 5 0 - 20 mm/hr   CRP, inflammation     Status: Normal   Result Value Ref Range    CRP Inflammation 3.31 <5.00 mg/L   Uric acid     Status: Normal   Result Value Ref Range    Uric Acid 6.9 3.4 - 7.0 mg/dL   CBC with platelets and differential     Status: Abnormal   Result Value Ref Range    WBC Count 11.9 (H) 4.0 - 11.0 10e3/uL    RBC Count 6.20 (H) 4.40 - 5.90 10e6/uL    Hemoglobin 17.3 13.3 - 17.7 g/dL    Hematocrit 52.7 40.0 - 53.0 %    MCV 85 78 - 100 fL    MCH 27.9 26.5 - 33.0 pg    MCHC 32.8 31.5 - 36.5 g/dL    RDW 14.0 10.0 - 15.0 %    Platelet Count 158 150 - 450 10e3/uL    % Neutrophils 67 %    % Lymphocytes 26 %    % Monocytes 6 %    % Eosinophils 2 %    % Basophils 0 %    Absolute Neutrophils 7.9 1.6 - 8.3 10e3/uL    Absolute Lymphocytes 3.1 0.8 - 5.3 10e3/uL    Absolute Monocytes 0.7 0.0 - 1.3 10e3/uL    Absolute Eosinophils 0.2 0.0 - 0.7 10e3/uL    Absolute Basophils 0.0 0.0 - 0.2 10e3/uL   CBC with platelets and differential     Status: Abnormal    Narrative    The following orders were created for panel order CBC with platelets and differential.  Procedure                               Abnormality         Status                     ---------                               -----------         ------                     CBC with platelets and d...[[id number]]  Abnormal            Final result                 Please view results for these tests on the individual orders.

## 2023-06-23 ENCOUNTER — OFFICE VISIT (OUTPATIENT)
Dept: FAMILY MEDICINE | Facility: CLINIC | Age: 63
End: 2023-06-23
Payer: COMMERCIAL

## 2023-06-23 VITALS
OXYGEN SATURATION: 97 % | BODY MASS INDEX: 34.21 KG/M2 | TEMPERATURE: 97.2 F | DIASTOLIC BLOOD PRESSURE: 82 MMHG | WEIGHT: 231 LBS | HEIGHT: 69 IN | RESPIRATION RATE: 20 BRPM | SYSTOLIC BLOOD PRESSURE: 130 MMHG | HEART RATE: 55 BPM

## 2023-06-23 DIAGNOSIS — Z12.5 ENCOUNTER FOR SCREENING FOR MALIGNANT NEOPLASM OF PROSTATE: ICD-10-CM

## 2023-06-23 DIAGNOSIS — Z11.4 SCREENING FOR HIV (HUMAN IMMUNODEFICIENCY VIRUS): ICD-10-CM

## 2023-06-23 DIAGNOSIS — Z11.59 NEED FOR HEPATITIS C SCREENING TEST: ICD-10-CM

## 2023-06-23 DIAGNOSIS — Z13.220 SCREENING CHOLESTEROL LEVEL: ICD-10-CM

## 2023-06-23 DIAGNOSIS — Z13.1 SCREENING FOR DIABETES MELLITUS: ICD-10-CM

## 2023-06-23 DIAGNOSIS — Z12.11 SCREEN FOR COLON CANCER: ICD-10-CM

## 2023-06-23 DIAGNOSIS — Z00.00 ROUTINE GENERAL MEDICAL EXAMINATION AT A HEALTH CARE FACILITY: Primary | ICD-10-CM

## 2023-06-23 LAB
CHOLEST SERPL-MCNC: 183 MG/DL
FASTING STATUS PATIENT QL REPORTED: YES
GLUCOSE SERPL-MCNC: 102 MG/DL (ref 70–99)
HCV AB SERPL QL IA: NONREACTIVE
HDLC SERPL-MCNC: 48 MG/DL
HIV 1+2 AB+HIV1 P24 AG SERPL QL IA: NONREACTIVE
LDLC SERPL CALC-MCNC: 104 MG/DL
NONHDLC SERPL-MCNC: 135 MG/DL
PSA SERPL DL<=0.01 NG/ML-MCNC: 0.77 NG/ML (ref 0–4.5)
TRIGL SERPL-MCNC: 153 MG/DL

## 2023-06-23 PROCEDURE — 36415 COLL VENOUS BLD VENIPUNCTURE: CPT | Performed by: FAMILY MEDICINE

## 2023-06-23 PROCEDURE — 90715 TDAP VACCINE 7 YRS/> IM: CPT | Performed by: FAMILY MEDICINE

## 2023-06-23 PROCEDURE — 99396 PREV VISIT EST AGE 40-64: CPT | Mod: 25 | Performed by: FAMILY MEDICINE

## 2023-06-23 PROCEDURE — 82947 ASSAY GLUCOSE BLOOD QUANT: CPT | Performed by: FAMILY MEDICINE

## 2023-06-23 PROCEDURE — 90471 IMMUNIZATION ADMIN: CPT | Performed by: FAMILY MEDICINE

## 2023-06-23 PROCEDURE — G0103 PSA SCREENING: HCPCS | Performed by: FAMILY MEDICINE

## 2023-06-23 PROCEDURE — 86803 HEPATITIS C AB TEST: CPT | Performed by: FAMILY MEDICINE

## 2023-06-23 PROCEDURE — 87389 HIV-1 AG W/HIV-1&-2 AB AG IA: CPT | Performed by: FAMILY MEDICINE

## 2023-06-23 PROCEDURE — 80061 LIPID PANEL: CPT | Performed by: FAMILY MEDICINE

## 2023-06-23 RX ORDER — MULTIVITAMIN,THERAPEUTIC
1 TABLET ORAL DAILY
COMMUNITY

## 2023-06-23 ASSESSMENT — ENCOUNTER SYMPTOMS
NAUSEA: 0
PALPITATIONS: 0
FREQUENCY: 0
CHILLS: 0
DIARRHEA: 0
SORE THROAT: 0
HEARTBURN: 0
MYALGIAS: 1
COUGH: 0
HEMATOCHEZIA: 0
ARTHRALGIAS: 1
CONSTIPATION: 0
PARESTHESIAS: 0
DIZZINESS: 0
HEMATURIA: 0
DYSURIA: 0
NERVOUS/ANXIOUS: 0
HEADACHES: 0
ABDOMINAL PAIN: 0
EYE PAIN: 0
SHORTNESS OF BREATH: 0
WEAKNESS: 0
FEVER: 0
JOINT SWELLING: 0

## 2023-06-23 ASSESSMENT — PAIN SCALES - GENERAL: PAINLEVEL: NO PAIN (0)

## 2023-06-23 NOTE — NURSING NOTE
Prior to immunization administration, verified patients identity using patient s name and date of birth. Please see Immunization Activity for additional information.     Screening Questionnaire for Adult Immunization    Are you sick today?   No   Do you have allergies to medications, food, a vaccine component or latex?   No   Have you ever had a serious reaction after receiving a vaccination?   No   Do you have a long-term health problem with heart, lung, kidney, or metabolic disease (e.g., diabetes), asthma, a blood disorder, no spleen, complement component deficiency, a cochlear implant, or a spinal fluid leak?  Are you on long-term aspirin therapy?   No   Do you have cancer, leukemia, HIV/AIDS, or any other immune system problem?   No   Do you have a parent, brother, or sister with an immune system problem?   No   In the past 3 months, have you taken medications that affect  your immune system, such as prednisone, other steroids, or anticancer drugs; drugs for the treatment of rheumatoid arthritis, Crohn s disease, or psoriasis; or have you had radiation treatments?   Don't Know   Have you had a seizure, or a brain or other nervous system problem?   No   During the past year, have you received a transfusion of blood or blood    products, or been given immune (gamma) globulin or antiviral drug?   No   For women: Are you pregnant or is there a chance you could become       pregnant during the next month?   No   Have you received any vaccinations in the past 4 weeks?   No     Immunization questionnaire was positive for at least one answer.  Notified Miguel Jerry.      Patient instructed to remain in clinic for 15 minutes afterwards, and to report any adverse reactions.     Screening performed by Jessica Estevez CMA on 6/23/2023 at 8:56 AM.

## 2023-06-23 NOTE — PATIENT INSTRUCTIONS
Lab work today.     Consider shingles vaccine in the future.     Schedule colonoscopy, if not call.   Call [phone] to schedule colonoscopy.        Preventive Health Recommendations  Male Ages 50 - 64    Yearly exam:             See your health care provider every year in order to  o   Review health changes.   o   Discuss preventive care.    o   Review your medicines if your doctor has prescribed any.   Have a cholesterol test every 5 years, or more frequently if you are at risk for high cholesterol/heart disease.   Have a diabetes test (fasting glucose) every three years. If you are at risk for diabetes, you should have this test more often.   Have a colonoscopy at age 50, or have a yearly FIT test (stool test). These exams will check for colon cancer.    Talk with your health care provider about whether or not a prostate cancer screening test (PSA) is right for you.  You should be tested each year for STDs (sexually transmitted diseases), if you re at risk.     Shots: Get a flu shot each year. Get a tetanus shot every 10 years.     Nutrition:  Eat at least 5 servings of fruits and vegetables daily.   Eat whole-grain bread, whole-wheat pasta and brown rice instead of white grains and rice.   Get adequate Calcium and Vitamin D.     Lifestyle  Exercise for at least 150 minutes a week (30 minutes a day, 5 days a week). This will help you control your weight and prevent disease.   Limit alcohol to one drink per day.   No smoking.   Wear sunscreen to prevent skin cancer.   See your dentist every six months for an exam and cleaning.   See your eye doctor every 1 to 2 years.

## 2023-06-23 NOTE — PROGRESS NOTES
SUBJECTIVE:   CC: Ned is an 63 year old who presents for preventative health visit.                  Healthy Habits:    Getting at least 3 servings of Calcium per day:  Yes    Bi-annual eye exam:  Yes    Dental care twice a year:  Yes    Sleep apnea or symptoms of sleep apnea:  None    Diet:  Other    Frequency of exercise:  2-3 days/week    Duration of exercise:  15-30 minutes    Taking medications regularly:  Yes    Medication side effects:  None    PHQ-2 Total Score:    Additional concerns today:  No      Immunizations: check with insurance about Zoster. TDAP today.   Cholesterol: screen today   Diabetes: screen today   Colon Cancer: Colonoscopy 4/2013 repeat 10 years. Order placed today.  Prostate: screen today   HIV screen: screen today   Hepatitis C screen: screen today   Tobacco: non-user.     The 10-year ASCVD risk score (Steve CHAUDHARI, et al., 2019) is: 8.9%    Values used to calculate the score:      Age: 63 years      Sex: Male      Is Non- : No      Diabetic: No      Tobacco smoker: No      Systolic Blood Pressure: 130 mmHg      Is BP treated: No      HDL Cholesterol: 50 mg/dL      Total Cholesterol: 184 mg/dL         Have you ever done Advance Care Planning? (For example, a Health Directive, POLST, or a discussion with a medical provider or your loved ones about your wishes): No, advance care planning information given to patient to review.  Advanced care planning was discussed at today's visit.    Social History     Tobacco Use     Smoking status: Never     Smokeless tobacco: Never   Substance Use Topics     Alcohol use: Yes     Comment: couple beers a week             6/23/2023     8:43 AM   Alcohol Use   Prescreen: >3 drinks/day or >7 drinks/week? No       Last PSA: No results found for: PSA    Reviewed orders with patient. Reviewed health maintenance and updated orders accordingly - Yes      Reviewed and updated as needed this visit by clinical staff   Tobacco  Allergies  Meds  " Problems  Med Hx  Surg Hx  Fam Hx          Reviewed and updated as needed this visit by Provider                     Review of Systems   Constitutional: Negative for chills and fever.   HENT: Negative for congestion, ear pain, hearing loss and sore throat.    Eyes: Negative for pain and visual disturbance.   Respiratory: Negative for cough and shortness of breath.    Cardiovascular: Negative for chest pain, palpitations and peripheral edema.   Gastrointestinal: Negative for abdominal pain, constipation, diarrhea, heartburn, hematochezia and nausea.   Genitourinary: Negative for dysuria, frequency, genital sores, hematuria, impotence, penile discharge and urgency.   Musculoskeletal: Positive for arthralgias and myalgias. Negative for joint swelling.   Skin: Negative for rash.   Neurological: Negative for dizziness, weakness, headaches and paresthesias.   Psychiatric/Behavioral: Negative for mood changes. The patient is not nervous/anxious.        OBJECTIVE:   /82 (BP Location: Left arm, Patient Position: Chair, Cuff Size: Adult Large)   Pulse 55   Temp 97.2  F (36.2  C) (Tympanic)   Resp 20   Ht 1.746 m (5' 8.75\")   Wt 104.8 kg (231 lb)   SpO2 97%   BMI 34.36 kg/m      Physical Exam  GENERAL: healthy, alert and no distress  EYES: Eyes grossly normal to inspection, PERRL and conjunctivae and sclerae normal  HENT: ear canals and TM's normal, nose and mouth without ulcers or lesions  NECK: no adenopathy, no asymmetry, masses, or scars and thyroid normal to palpation  RESP: lungs clear to auscultation - no rales, rhonchi or wheezes  CV: regular rate and rhythm, normal S1 S2, no S3 or S4, no murmur, click or rub, no peripheral edema and peripheral pulses strong  ABDOMEN: soft, nontender, no hepatosplenomegaly, no masses and bowel sounds normal  MS: no gross musculoskeletal defects noted, no edema  SKIN: no suspicious lesions or rashes  NEURO: Normal strength and tone, mentation intact and speech " "normal  PSYCH: mentation appears normal, affect normal/bright      ASSESSMENT/PLAN:   Ned was seen today for physical.    Diagnoses and all orders for this visit:    Routine general medical examination at a health care facility  Immunizations: check with insurance about Zoster. TDAP today.   Cholesterol: screen today   Diabetes: screen today   Colon Cancer: Colonoscopy 4/2013 repeat 10 years. Order placed today.  Prostate: screen today   HIV screen: screen today   Hepatitis C screen: screen today   Tobacco: non-user.     Screen for colon cancer  -     Colonoscopy Screening  Referral; Future    Screening for HIV (human immunodeficiency virus)  -     HIV Antigen Antibody Combo    Need for hepatitis C screening test  -     Hepatitis C Screen Reflex to HCV RNA Quant and Genotype    Screening for diabetes mellitus  -     Glucose    Encounter for screening for malignant neoplasm of prostate  -     PSA, screen    Screening cholesterol level  -     Lipid panel reflex to direct LDL Fasting    Other orders  -     TDAP VACCINE (Adacel, Boostrix)        Patient has been advised of split billing requirements and indicates understanding: Yes      COUNSELING:   Reviewed preventive health counseling, as reflected in patient instructions       Regular exercise       Healthy diet/nutrition       Consider Hep C screening for all patients one time for ages 18-79 years       Colorectal cancer screening       Prostate cancer screening      BMI:   Estimated body mass index is 34.36 kg/m  as calculated from the following:    Height as of this encounter: 1.746 m (5' 8.75\").    Weight as of this encounter: 104.8 kg (231 lb).   Weight management plan: Discussed healthy diet and exercise guidelines      He reports that he has never smoked. He has never used smokeless tobacco.            Ryland Rivera, DO  [facility]  "

## 2023-09-15 ENCOUNTER — OFFICE VISIT (OUTPATIENT)
Dept: DERMATOLOGY | Facility: CLINIC | Age: 63
End: 2023-09-15
Payer: COMMERCIAL

## 2023-09-15 DIAGNOSIS — D22.9 MULTIPLE BENIGN NEVI: ICD-10-CM

## 2023-09-15 DIAGNOSIS — L82.1 SEBORRHEIC KERATOSIS: Primary | ICD-10-CM

## 2023-09-15 DIAGNOSIS — L81.4 LENTIGO: ICD-10-CM

## 2023-09-15 DIAGNOSIS — D18.01 CHERRY ANGIOMA: ICD-10-CM

## 2023-09-15 PROCEDURE — 99213 OFFICE O/P EST LOW 20 MIN: CPT | Performed by: PHYSICIAN ASSISTANT

## 2023-09-15 ASSESSMENT — PAIN SCALES - GENERAL: PAINLEVEL: NO PAIN (0)

## 2023-09-15 NOTE — LETTER
9/15/2023         RE: Ned Wilcox  [address]  [address]  [place] [address]        Dear Colleague,    Thank you for referring your patient, Ned Wilcox, to the [facility]. Please see a copy of my visit note below.    Ned Wilcox is an extremely pleasant 63 year old year old male patient here today for skin check. He denies any painful or bleeding skin lesions. No new moles. Patient has no other skin complaints today.  Remainder of the HPI, Meds, PMH, Allergies, FH, and SH was reviewed in chart.    Pertinent Hx:  No personal history of skin cancer   No past medical history on file.    Past Surgical History:   Procedure Laterality Date     ANKLE FRACTURE SURGERY Right     age 23     APPENDECTOMY      age 14        Family History   Problem Relation Age of Onset     Dementia Mother      Hyperlipidemia Mother      Lymphoma Father      Diabetes Father      Breast Cancer Sister      Ovarian Cancer Sister      Breast Cancer Sister        Social History     Socioeconomic History     Marital status:      Spouse name: Not on file     Number of children: Not on file     Years of education: Not on file     Highest education level: Not on file   Occupational History     Not on file   Tobacco Use     Smoking status: Never     Smokeless tobacco: Never   Vaping Use     Vaping Use: Never used   Substance and Sexual Activity     Alcohol use: Yes     Comment: couple beers a week     Drug use: Never     Sexual activity: Yes     Partners: Female   Other Topics Concern     Not on file   Social History Narrative     Not on file     Social Determinants of Health     Financial Resource Strain: Not on file   Food Insecurity: Not on file   Transportation Needs: Not on file   Physical Activity: Not on file   Stress: Not on file   Social Connections: Not on file   Intimate Partner Violence: Not on file   Housing Stability: Not on file       Outpatient Encounter Medications as of 9/15/2023    Medication Sig Dispense Refill     multivitamin, therapeutic (THERA-VIT) TABS tablet Take 1 tablet by mouth daily       No facility-administered encounter medications on file as of 9/15/2023.             O:   NAD, WDWN, Alert & Oriented, Mood & Affect wnl, Vitals stable   Here today alone   There were no vitals taken for this visit.   General appearance normal   Vitals stable   Alert, oriented and in no acute distress      Stuck on papules and brown macules on trunk and ext   Red papules on trunk  Brown papules and macules with regular pigment network and borders on torso and extremities  Brown plaque on left leg      The remainder of skin exam is normal                                 Eyes: Conjunctivae/lids:Normal                                 ENT: Lips: normal                                MSK:Normal                                Pulm: Breathing Normal                                Neuro/Psych: Orientation:Alert and Orientedx3 ; Mood/Affect:normal     1.  Seborrheic keratosis, lentigo, angioma, benign nevi, benign nevi, congenital nevus   BENIGN LESIONS DISCUSSED WITH PATIENT:  I discussed the specifics of tumor, prognosis, and genetics of benign lesions.  I explained that treatment of these lesions would be purely cosmetic and not medically neccessary.  I discussed with patient different removal options including excision, cautery and /or laser.       Nature and genetics of benign skin lesions dicussed with patient.  Signs and Symptoms of skin cancer discussed with patient.  ABCDEs of melanoma reviewed with patient.  Patient encouraged to perform monthly skin exams.  UV precautions reviewed with patient.  Risks of non-melanoma skin cancer discussed with patient   Return to clinic in one year or sooner if needed.          Again, thank you for allowing me to participate in the care of your patient.        Sincerely,        Elicia Arellano PA-C

## 2023-09-15 NOTE — PROGRESS NOTES
Ned Wilcox is an extremely pleasant 63 year old year old male patient here today for skin check. He denies any painful or bleeding skin lesions. No new moles. Patient has no other skin complaints today.  Remainder of the HPI, Meds, PMH, Allergies, FH, and SH was reviewed in chart.    Pertinent Hx:  No personal history of skin cancer   No past medical history on file.    Past Surgical History:   Procedure Laterality Date    ANKLE FRACTURE SURGERY Right     age 23    APPENDECTOMY      age 14        Family History   Problem Relation Age of Onset    Dementia Mother     Hyperlipidemia Mother     Lymphoma Father     Diabetes Father     Breast Cancer Sister     Ovarian Cancer Sister     Breast Cancer Sister        Social History     Socioeconomic History    Marital status:      Spouse name: Not on file    Number of children: Not on file    Years of education: Not on file    Highest education level: Not on file   Occupational History    Not on file   Tobacco Use    Smoking status: Never    Smokeless tobacco: Never   Vaping Use    Vaping Use: Never used   Substance and Sexual Activity    Alcohol use: Yes     Comment: couple beers a week    Drug use: Never    Sexual activity: Yes     Partners: Female   Other Topics Concern    Not on file   Social History Narrative    Not on file     Social Determinants of Health     Financial Resource Strain: Not on file   Food Insecurity: Not on file   Transportation Needs: Not on file   Physical Activity: Not on file   Stress: Not on file   Social Connections: Not on file   Intimate Partner Violence: Not on file   Housing Stability: Not on file       Outpatient Encounter Medications as of 9/15/2023   Medication Sig Dispense Refill    multivitamin, therapeutic (THERA-VIT) TABS tablet Take 1 tablet by mouth daily       No facility-administered encounter medications on file as of 9/15/2023.             O:   NAD, WDWN, Alert & Oriented, Mood & Affect wnl, Vitals stable   Here today  alone   There were no vitals taken for this visit.   General appearance normal   Vitals stable   Alert, oriented and in no acute distress      Stuck on papules and brown macules on trunk and ext   Red papules on trunk  Brown papules and macules with regular pigment network and borders on torso and extremities  Brown plaque on left leg      The remainder of skin exam is normal                                 Eyes: Conjunctivae/lids:Normal                                 ENT: Lips: normal                                MSK:Normal                                Pulm: Breathing Normal                                Neuro/Psych: Orientation:Alert and Orientedx3 ; Mood/Affect:normal     1.  Seborrheic keratosis, lentigo, angioma, benign nevi, benign nevi, congenital nevus   BENIGN LESIONS DISCUSSED WITH PATIENT:  I discussed the specifics of tumor, prognosis, and genetics of benign lesions.  I explained that treatment of these lesions would be purely cosmetic and not medically neccessary.  I discussed with patient different removal options including excision, cautery and /or laser.       Nature and genetics of benign skin lesions dicussed with patient.  Signs and Symptoms of skin cancer discussed with patient.  ABCDEs of melanoma reviewed with patient.  Patient encouraged to perform monthly skin exams.  UV precautions reviewed with patient.  Risks of non-melanoma skin cancer discussed with patient   Return to clinic in one year or sooner if needed.

## 2023-11-13 ENCOUNTER — TELEPHONE (OUTPATIENT)
Dept: FAMILY MEDICINE | Facility: CLINIC | Age: 63
End: 2023-11-13
Payer: COMMERCIAL

## 2023-11-13 NOTE — TELEPHONE ENCOUNTER
Patient Quality Outreach    Patient is due for the following:   Colon Cancer Screening    Next Steps:   Schedule colonoscopy    Type of outreach:    Sent [employer] message.      Questions for provider review:    None           Jessica Estevez, [facility]

## 2023-12-08 ENCOUNTER — TELEPHONE (OUTPATIENT)
Dept: FAMILY MEDICINE | Facility: CLINIC | Age: 63
End: 2023-12-08

## 2023-12-08 ENCOUNTER — E-VISIT (OUTPATIENT)
Dept: FAMILY MEDICINE | Facility: CLINIC | Age: 63
End: 2023-12-08
Payer: COMMERCIAL

## 2023-12-08 ENCOUNTER — LAB (OUTPATIENT)
Dept: LAB | Facility: CLINIC | Age: 63
End: 2023-12-08
Attending: FAMILY MEDICINE
Payer: COMMERCIAL

## 2023-12-08 DIAGNOSIS — R05.9 COUGH, UNSPECIFIED TYPE: ICD-10-CM

## 2023-12-08 DIAGNOSIS — R05.9 COUGH, UNSPECIFIED TYPE: Primary | ICD-10-CM

## 2023-12-08 PROCEDURE — 87635 SARS-COV-2 COVID-19 AMP PRB: CPT

## 2023-12-08 PROCEDURE — 99421 OL DIG E/M SVC 5-10 MIN: CPT | Performed by: FAMILY MEDICINE

## 2023-12-08 NOTE — TELEPHONE ENCOUNTER
Patient just needs a hard copy of the results. Advised to print if off from his mychart.    Valerie Medina RN

## 2023-12-09 ENCOUNTER — TELEPHONE (OUTPATIENT)
Dept: NURSING | Facility: CLINIC | Age: 63
End: 2023-12-09
Payer: COMMERCIAL

## 2023-12-09 LAB — SARS-COV-2 RNA RESP QL NAA+PROBE: POSITIVE

## 2023-12-09 NOTE — TELEPHONE ENCOUNTER
Coronavirus (COVID-19) Notification    Caller Name (Patient, parent, daughter/son, grandparent, etc)  patient    Reason for call  Notify of Positive Coronavirus (COVID-19) lab results, assess symptoms,  review [facility] recommendations    Lab Result    Lab test:  2019-nCoV rRt-PCR or SARS-CoV-2 PCR    Oropharyngeal AND/OR nasopharyngeal swabs is POSITIVE for 2019-nCoV RNA/SARS-COV-2 PCR (COVID-19 virus)      Gather patient reported symptoms   Assessment   Current Symptoms at time of phone call, reported by patient: (if no symptoms, document: No symptoms] Feeling better   Date of symptom(s) onset (if applicable) N/a     If at time of call, Patients symptoms have worsened, the Patient should contact 911 or have someone drive them to Emergency Dept promptly:    If Patient calling 911, inform 911 personal that you have tested positive for the Coronavirus (COVID-19).  Place mask on and await 911 to arrive.  If Emergency Dept, If possible, please have another adult drive you to the Emergency Dept but you need to wear mask when in contact with other people.      Treatment Options:   Is patient interested in discussing COVID treatment? No.        Review information with Patient    Your result was positive. This means you have COVID-19 (coronavirus).    How can I protect others?    These guidelines are for isolating before returning to work, school or .    If you DO have symptoms  Stay home and away from others   For at least 5 days after your symptoms started, AND  You are fever free for 24 hours (with no medicine that reduces fever), AND  Your other symptoms are better  Wear a mask for 10 full days anytime you are around others    If you DON'T have symptoms  Stay home and away from others for at least 5 days after your positive test  Wear a mask for 10 full days anytime you are around others    There may be different guidelines for healthcare facilities.  Please check with the specific sites before  arriving.    If you have been told by a doctor that you were severely ill with COVID-19 or are immunocompromised, you should isolate for at least 10 days.    You should not go back to work until you meet the guidelines above for ending your home isolation. You don't need to be retested for COVID-19 before going back to work--studies show that you won't spread the virus if it's been at least 10 days since your symptoms started (or 20 days, if you have a weak immune system).    Employers, schools, and daycares: This is an official notice for this person's medical guidelines for returning in-person.  They must meet the above guidelines before going back to work, school or  in person.    You will receive a positive COVID-19 letter via [employer] or the mail soon with additional self-care information.    Would you like me to review some of that information with you now?  No    If you were tested for an upcoming procedure, please contact your provider for next steps.    Margarita Sarabia

## 2024-03-13 ENCOUNTER — TELEPHONE (OUTPATIENT)
Dept: FAMILY MEDICINE | Facility: CLINIC | Age: 64
End: 2024-03-13
Payer: COMMERCIAL

## 2024-03-13 NOTE — TELEPHONE ENCOUNTER
Patient Quality Outreach    Patient is due for the following:   Colon Cancer Screening    Next Steps:   Schedule colonoscopy    Type of outreach:    Sent [employer] message.      Questions for provider review:    None           Jessica Estevez, [facility]

## 2024-06-25 ENCOUNTER — TELEPHONE (OUTPATIENT)
Dept: FAMILY MEDICINE | Facility: CLINIC | Age: 64
End: 2024-06-25
Payer: COMMERCIAL

## 2024-06-25 NOTE — LETTER
June 25, 2024    To  Ned Wilcox  [address]  [address]   [facility] [place] [address]    Your team at [facility] cares about your health. We have reviewed your chart and based on our findings; we are making the following recommendations to better manage your health.     You are in particular need of attention regarding the following:     Colon cancer is now the second leading cause of cancer-related deaths in the [place] for both men and women and there are over 130,000 new cases and 50,000 deaths per year from colon cancer. Colonoscopies can prevent 90-95% of these deaths. Problem lesions can be removed before they ever become cancer. This test is not only looking for cancer, but also getting rid of precancerous lesions.   PREVENTATIVE VISIT: Physical    You can call [phone] to schedule your colonoscopy.    If you have already completed these items, please contact the clinic via phone or   [employer] so your care team can review and update your records. Thank you for   choosing [facility] for your healthcare needs. For any questions,   concerns, or to schedule an appointment please contact our clinic.    Healthy Regards,      Your [facility] Care Team

## 2024-06-25 NOTE — TELEPHONE ENCOUNTER
Patient Quality Outreach    Patient is due for the following:   Colon Cancer Screening  Physical Preventive Adult Physical    Next Steps:   Schedule a Adult Preventative    Type of outreach:    Sent letter.      Questions for provider review:    None           Jessica Estevez, [facility]

## 2024-08-31 ENCOUNTER — HEALTH MAINTENANCE LETTER (OUTPATIENT)
Age: 64
End: 2024-08-31

## 2024-09-24 ENCOUNTER — TELEPHONE (OUTPATIENT)
Dept: FAMILY MEDICINE | Facility: CLINIC | Age: 64
End: 2024-09-24
Payer: COMMERCIAL

## 2024-09-24 NOTE — TELEPHONE ENCOUNTER
Patient Quality Outreach    Patient is due for the following:   Colon Cancer Screening    Next Steps:   Schedule a Adult Preventative    Type of outreach:    Sent [employer] message.    Next Steps:  Reach out within 90 days via [employer].    Max number of attempts reached: No. Will try again in 90 days if patient still on fail list.    Questions for provider review:    None           Kate Michael MA

## 2024-12-16 ENCOUNTER — OFFICE VISIT (OUTPATIENT)
Dept: DERMATOLOGY | Facility: CLINIC | Age: 64
End: 2024-12-16
Payer: COMMERCIAL

## 2024-12-16 DIAGNOSIS — L82.0 INFLAMED SEBORRHEIC KERATOSIS: Primary | ICD-10-CM

## 2024-12-16 PROCEDURE — 17110 DESTRUCTION B9 LES UP TO 14: CPT | Performed by: PHYSICIAN ASSISTANT

## 2024-12-16 NOTE — LETTER
12/16/2024      Ned Wilcox  [address]  [address]  [facility] [address]      Dear Colleague,    Thank you for referring your patient, Ned Wilcox, to the [facility]. Please see a copy of my visit note below.    HPI:   Chief complaints: Ned Wilcox is a pleasant 64 year old male who presents for evaluation of a spot on the right cheek. It has been present for a while. Recently it was irritated by clothing and is raised and painful. No other spots of concern today.       PHYSICAL EXAM:    There were no vitals taken for this visit.  Skin exam performed as follows: Type 2 skin. Mood appropriate  Alert and Oriented X 3. Well developed, well nourished in no distress.  General appearance: Normal  Head including face: Normal  Eyes: conjunctiva and lids: Normal  Mouth: Lips, teeth, gums: Normal  Neck: Normal  Skin: Scalp and body hair: See below    Inflamed keratotic papule on the right cheek x 1    ASSESSMENT/PLAN:     Inflamed seborrheic keratosis on the right cheek x 1. As physically tender cryosurgery performed. Advised on post op care.             Follow-up: for FSE/PRN sooner  CC:   Scribed By: Aubree Diallo, MS, PAMaribelC      Again, thank you for allowing me to participate in the care of your patient.        Sincerely,        Aubree Diallo PA-C

## 2025-02-04 ENCOUNTER — OFFICE VISIT (OUTPATIENT)
Dept: FAMILY MEDICINE | Facility: CLINIC | Age: 65
End: 2025-02-04
Payer: COMMERCIAL

## 2025-02-04 VITALS
TEMPERATURE: 98 F | RESPIRATION RATE: 18 BRPM | OXYGEN SATURATION: 98 % | DIASTOLIC BLOOD PRESSURE: 80 MMHG | WEIGHT: 232 LBS | HEIGHT: 69 IN | SYSTOLIC BLOOD PRESSURE: 100 MMHG | BODY MASS INDEX: 34.36 KG/M2 | HEART RATE: 59 BPM

## 2025-02-04 DIAGNOSIS — Z13.220 SCREENING CHOLESTEROL LEVEL: ICD-10-CM

## 2025-02-04 DIAGNOSIS — Z12.11 SCREEN FOR COLON CANCER: ICD-10-CM

## 2025-02-04 DIAGNOSIS — Z00.00 ROUTINE GENERAL MEDICAL EXAMINATION AT A HEALTH CARE FACILITY: Primary | ICD-10-CM

## 2025-02-04 DIAGNOSIS — Z12.5 ENCOUNTER FOR SCREENING FOR MALIGNANT NEOPLASM OF PROSTATE: ICD-10-CM

## 2025-02-04 DIAGNOSIS — R73.01 ELEVATED FASTING GLUCOSE: ICD-10-CM

## 2025-02-04 DIAGNOSIS — Z12.11 COLON CANCER SCREENING: ICD-10-CM

## 2025-02-04 PROBLEM — R73.03 PREDIABETES: Status: ACTIVE | Noted: 2025-02-04

## 2025-02-04 LAB
CHOLEST SERPL-MCNC: 179 MG/DL
EST. AVERAGE GLUCOSE BLD GHB EST-MCNC: 117 MG/DL
FASTING STATUS PATIENT QL REPORTED: YES
FASTING STATUS PATIENT QL REPORTED: YES
GLUCOSE SERPL-MCNC: 101 MG/DL (ref 70–99)
HBA1C MFR BLD: 5.7 % (ref 0–5.6)
HDLC SERPL-MCNC: 46 MG/DL
LDLC SERPL CALC-MCNC: 111 MG/DL
NONHDLC SERPL-MCNC: 133 MG/DL
PSA SERPL DL<=0.01 NG/ML-MCNC: 0.87 NG/ML (ref 0–4.5)
TRIGL SERPL-MCNC: 112 MG/DL

## 2025-02-04 PROCEDURE — 80061 LIPID PANEL: CPT | Performed by: FAMILY MEDICINE

## 2025-02-04 PROCEDURE — 99396 PREV VISIT EST AGE 40-64: CPT | Performed by: FAMILY MEDICINE

## 2025-02-04 PROCEDURE — 82947 ASSAY GLUCOSE BLOOD QUANT: CPT | Performed by: FAMILY MEDICINE

## 2025-02-04 PROCEDURE — G0103 PSA SCREENING: HCPCS | Performed by: FAMILY MEDICINE

## 2025-02-04 PROCEDURE — 83036 HEMOGLOBIN GLYCOSYLATED A1C: CPT | Performed by: FAMILY MEDICINE

## 2025-02-04 PROCEDURE — 36415 COLL VENOUS BLD VENIPUNCTURE: CPT | Performed by: FAMILY MEDICINE

## 2025-02-04 SDOH — HEALTH STABILITY: PHYSICAL HEALTH: ON AVERAGE, HOW MANY DAYS PER WEEK DO YOU ENGAGE IN MODERATE TO STRENUOUS EXERCISE (LIKE A BRISK WALK)?: 2 DAYS

## 2025-02-04 ASSESSMENT — SOCIAL DETERMINANTS OF HEALTH (SDOH): HOW OFTEN DO YOU GET TOGETHER WITH FRIENDS OR RELATIVES?: ONCE A WEEK

## 2025-02-04 ASSESSMENT — PAIN SCALES - GENERAL: PAINLEVEL_OUTOF10: NO PAIN (0)

## 2025-02-04 NOTE — PROGRESS NOTES
"Preventive Care Visit  [facility]  Ryland Rivera DO, Family Medicine  Feb 4, 2025      Assessment & Plan     Routine general medical examination at a health care facility  Cholesterol: screen today   Diabetes: elevated glucose in the past. Recheck today.   Colon Cancer: colonoscopy ordered.   Prostate: screen today.   Diet/Exercise: active with walking in the fall.   Tobacco: non-user     Screen for colon cancer  - Colonoscopy Screening  Referral    Encounter for screening for malignant neoplasm of prostate  - PSA, screen    Elevated fasting glucose  - Glucose  - Hemoglobin A1c    Screening cholesterol level  - Lipid panel reflex to direct LDL Fasting    Colon cancer screening  - Colonoscopy Screening  Referral      Patient has been advised of split billing requirements and indicates understanding: N/A    The risks, benefits and treatment options of prescribed medications or other treatments have been discussed with the patient. The patient verbalized their understanding and should call or follow up if no improvement or if they develop further problems.      BMI  Estimated body mass index is 34.26 kg/m  as calculated from the following:    Height as of this encounter: 1.753 m (5' 9\").    Weight as of this encounter: 105.2 kg (232 lb).   Weight management plan: Discussed healthy diet and exercise guidelines    Counseling  Appropriate preventive services were addressed with this patient via screening, questionnaire, or discussion as appropriate for fall prevention, nutrition, physical activity, Tobacco-use cessation, social engagement, weight loss and cognition.  Checklist reviewing preventive services available has been given to the patient.  Reviewed patient's diet, addressing concerns and/or questions.   He is at risk for lack of exercise and has been provided with information to increase physical activity for the benefit of his well-being.   He is at risk for " psychosocial distress and has been provided with information to reduce risk.           Mckenzie Marino is a 64 year old, presenting for the following:  Physical        2/4/2025     9:56 AM   Additional Questions   Roomed by Jessica RENNER    64 year old male who presents for annual exam.     Cholesterol: screen today   Diabetes: elevated glucose in the past. Recheck today.   Colon Cancer: colonoscopy ordered.   Prostate: screen today.   Diet/Exercise: active with walking in the fall.   Tobacco: non-user       Health Care Directive  Patient does not have a Health Care Directive: Discussed advance care planning with patient; information given to patient to review.      2/4/2025   General Health   How would you rate your overall physical health? Good   Feel stress (tense, anxious, or unable to sleep) To some extent   (!) STRESS CONCERN      2/4/2025   Nutrition   Three or more servings of calcium each day? Yes   Diet: Regular (no restrictions)   How many servings of fruit and vegetables per day? (!) 0-1   How many sweetened beverages each day? 0-1         2/4/2025   Exercise   Days per week of moderate/strenous exercise 2 days   (!) EXERCISE CONCERN      2/4/2025   Social Factors   Frequency of gathering with friends or relatives Once a week   Worry food won't last until get money to buy more No   Food not last or not have enough money for food? No   Do you have housing? (Housing is defined as stable permanent housing and does not include staying ouside in a car, in a tent, in an abandoned building, in an overnight shelter, or couch-surfing.) Yes   Are you worried about losing your housing? No   Lack of transportation? No   Unable to get utilities (heat,electricity)? No         2/4/2025   Fall Risk   Fallen 2 or more times in the past year? No   Trouble with walking or balance? No          2/4/2025   Dental   Dentist two times every year? Yes         2/4/2025   TB Screening   Were you born outside of the [place]? No  "        Today's PHQ-2 Score:       2/4/2025     9:53 AM   PHQ-2 ( 1999 [employer])   Q1: Little interest or pleasure in doing things 0   Q2: Feeling down, depressed or hopeless 0   PHQ-2 Score 0    Q1: Little interest or pleasure in doing things Not at all   Q2: Feeling down, depressed or hopeless Not at all   PHQ-2 Score 0       Patient-reported           2/4/2025   Substance Use   Alcohol more than 3/day or more than 7/wk No   Do you use any other substances recreationally? (!) CANNABIS PRODUCTS     Social History     Tobacco Use    Smoking status: Never    Smokeless tobacco: Never   Vaping Use    Vaping status: Never Used   Substance Use Topics    Alcohol use: Yes     Comment: couple beers a week    Drug use: Yes     Types: Marijuana           2/4/2025   STI Screening   New sexual partner(s) since last STI/HIV test? No   Last PSA:   Prostate Specific Antigen Screen   Date Value Ref Range Status   06/23/2023 0.77 0.00 - 4.50 ng/mL Final     ASCVD Risk   The 10-year ASCVD risk score (Steve CHAUDHARI, et al., 2019) is: 6.4%    Values used to calculate the score:      Age: 64 years      Sex: Male      Is Non- : No      Diabetic: No      Tobacco smoker: No      Systolic Blood Pressure: 100 mmHg      Is BP treated: No      HDL Cholesterol: 48 mg/dL      Total Cholesterol: 183 mg/dL           Reviewed and updated as needed this visit by Provider   Tobacco  Allergies  Meds  Problems  Med Hx  Surg Hx  Fam Hx                  Review of Systems  Constitutional, HEENT, cardiovascular, pulmonary, gi and gu systems are negative, except as otherwise noted.     Objective    Exam  /80 (BP Location: Right arm, Patient Position: Chair, Cuff Size: Adult Regular)   Pulse 59   Temp 98  F (36.7  C) (Oral)   Resp 18   Ht 1.753 m (5' 9\")   Wt 105.2 kg (232 lb)   SpO2 98%   BMI 34.26 kg/m     Estimated body mass index is 34.26 kg/m  as calculated from the following:    Height as of this " "encounter: 1.753 m (5' 9\").    Weight as of this encounter: 105.2 kg (232 lb).    Physical Exam  GENERAL: alert and no distress  EYES: Eyes grossly normal to inspection, PERRL and conjunctivae and sclerae normal  HENT: ear canals and TM's normal, nose and mouth without ulcers or lesions  NECK: no adenopathy, no asymmetry, masses, or scars  RESP: lungs clear to auscultation - no rales, rhonchi or wheezes  CV: regular rate and rhythm, normal S1 S2, no S3 or S4, no murmur, click or rub, no peripheral edema  ABDOMEN: soft, nontender, no hepatosplenomegaly, no masses and bowel sounds normal  MS: no gross musculoskeletal defects noted, no edema  SKIN: no suspicious lesions or rashes  NEURO: Normal strength and tone, mentation intact and speech normal  PSYCH: mentation appears normal, affect normal/bright        Signed Electronically by: Ryland Rivera DO    "

## 2025-02-28 ENCOUNTER — MYC MEDICAL ADVICE (OUTPATIENT)
Dept: GASTROENTEROLOGY | Facility: CLINIC | Age: 65
End: 2025-02-28

## 2025-04-28 NOTE — TELEPHONE ENCOUNTER
Patient Quality Outreach    Patient is due for the following:   Colon Cancer Screening    Action(s) Taken:   Schedule colonoscopy    Type of outreach:    Phone, spoke to patient/parent. He will schedule when work is lighter.    Questions for provider review:    None         Jessica Estevez, [facility]  Chart routed to None.